# Patient Record
Sex: MALE | Race: ASIAN | URBAN - METROPOLITAN AREA
[De-identification: names, ages, dates, MRNs, and addresses within clinical notes are randomized per-mention and may not be internally consistent; named-entity substitution may affect disease eponyms.]

---

## 2022-01-27 ENCOUNTER — OFFICE VISIT (OUTPATIENT)
Dept: URBAN - METROPOLITAN AREA CLINIC 33 | Facility: CLINIC | Age: 42
End: 2022-01-27
Payer: COMMERCIAL

## 2022-01-27 VITALS
HEIGHT: 69 IN | DIASTOLIC BLOOD PRESSURE: 78 MMHG | WEIGHT: 230 LBS | HEART RATE: 78 BPM | BODY MASS INDEX: 34.07 KG/M2 | OXYGEN SATURATION: 99 % | SYSTOLIC BLOOD PRESSURE: 122 MMHG

## 2022-01-27 DIAGNOSIS — R14.3 FLATULENCE: ICD-10-CM

## 2022-01-27 DIAGNOSIS — R19.4 CHANGE IN BOWEL HABIT: ICD-10-CM

## 2022-01-27 DIAGNOSIS — R93.5 ABNORMAL ABDOMINAL CT SCAN: ICD-10-CM

## 2022-01-27 DIAGNOSIS — R10.12 LEFT UPPER QUADRANT PAIN: ICD-10-CM

## 2022-01-27 PROBLEM — 249504006 FLATULENCE: Status: ACTIVE | Noted: 2022-01-27

## 2022-01-27 PROCEDURE — 99213 OFFICE O/P EST LOW 20 MIN: CPT | Performed by: INTERNAL MEDICINE

## 2022-01-27 RX ORDER — SODIUM, POTASSIUM,MAG SULFATES 17.5-3.13G
177ML SOLUTION, RECONSTITUTED, ORAL ORAL ONCE A DAY
Qty: 354 ML | Refills: 0 | OUTPATIENT
Start: 2022-01-27 | End: 2022-01-29

## 2022-01-27 RX ORDER — CHLORHEXIDINE GLUCONATE 4 %
AS DIRECTED LIQUID (ML) TOPICAL
Status: ACTIVE | COMMUNITY

## 2022-01-27 NOTE — HPI-ABDOMINAL PAIN
40 y/o male presents today for a consultation for abdominal pain with onset 1 year ago.  Described as an inttermittent pinching pain that occur without provocation and will dissipate on it's own.   Pain initially began in LUQ abdominal region, occasionally radiating around to left and right side of his back.  In Sept. 2021 he began to experience pain in RUQ and LUQ.    Pain worsen Sept. 29 2021 and presented to [facility] ER at the request of PCP top r/o kidney stone.  A CT Abdomen and Pelvis was perfomred with findings of:  Multiple fluid-filled loops of nondilated small bowel with subtle inflammatory type changes of the mesentery such as may be associated with enteritis in the appropriate clinical setting.  He has not taken medication for symptoms.

## 2022-01-27 NOTE — HPI-DIARRHEA
He reports change in bowel habits over the past month.   Described as more frequent bowel movements per day, 2-3 times a day with varying stool consistency.  Stools are pebble-like to lose pieces.  Denies melena, blood, mucus in stools, rectal pain/bleeding.  Admit occasional pruritus ani.   He completed stool studies with PCP (1/19/2022).   Previous bowel habits were 1 normal and formed evacuation per day without strain.   He was treated with an antibiotic in Sept. 2021 for COVID-19.  Denies drinking well or lake water or travel out of the country in the past 6 months.   He denies having a colonoscopy.  Denies a known family history of colon polyps or cancer.

## 2022-02-01 ENCOUNTER — TELEPHONE ENCOUNTER (OUTPATIENT)
Dept: URBAN - METROPOLITAN AREA CLINIC 36 | Facility: CLINIC | Age: 42
End: 2022-02-01

## 2022-02-01 RX ORDER — SODIUM, POTASSIUM,MAG SULFATES 17.5-3.13G
177ML SOLUTION, RECONSTITUTED, ORAL ORAL ONCE A DAY
Qty: 354 ML | Refills: 0
Start: 2022-01-27 | End: 2022-02-03

## 2022-02-02 ENCOUNTER — TELEPHONE ENCOUNTER (OUTPATIENT)
Dept: URBAN - METROPOLITAN AREA CLINIC 36 | Facility: CLINIC | Age: 42
End: 2022-02-02

## 2022-02-02 RX ORDER — SODIUM, POTASSIUM,MAG SULFATES 17.5-3.13G
177ML SOLUTION, RECONSTITUTED, ORAL ORAL ONCE A DAY
Qty: 354 ML | OUTPATIENT

## 2022-02-16 PROBLEM — 301715003 LEFT UPPER QUADRANT PAIN: Status: ACTIVE | Noted: 2022-01-27

## 2022-02-17 ENCOUNTER — TELEPHONE ENCOUNTER (OUTPATIENT)
Dept: URBAN - METROPOLITAN AREA CLINIC 35 | Facility: CLINIC | Age: 42
End: 2022-02-17

## 2022-02-18 ENCOUNTER — OFFICE VISIT (OUTPATIENT)
Dept: URBAN - METROPOLITAN AREA MEDICAL CENTER 10 | Facility: MEDICAL CENTER | Age: 42
End: 2022-02-18
Payer: COMMERCIAL

## 2022-02-18 DIAGNOSIS — K52.81 EOSINOPHILIC GASTROENTERITIS: ICD-10-CM

## 2022-02-18 DIAGNOSIS — K63.89 BACTERIAL OVERGROWTH SYNDROME: ICD-10-CM

## 2022-02-18 DIAGNOSIS — R19.4 ALTERATION IN BOWEL ELIMINATION: ICD-10-CM

## 2022-02-18 DIAGNOSIS — R93.3 ABN FINDINGS-GI TRACT: ICD-10-CM

## 2022-02-18 DIAGNOSIS — D12.4 ADENOMA OF DESCENDING COLON: ICD-10-CM

## 2022-02-18 DIAGNOSIS — K31.89 ACQUIRED DEFORMITY OF DUODENUM: ICD-10-CM

## 2022-02-18 DIAGNOSIS — K29.60 ADENOPAPILLOMATOSIS GASTRICA: ICD-10-CM

## 2022-02-18 DIAGNOSIS — D12.8 ADENOMATOUS POLYP OF RECTUM: ICD-10-CM

## 2022-02-18 DIAGNOSIS — D12.2 ADENOMA OF ASCENDING COLON: ICD-10-CM

## 2022-02-18 PROCEDURE — 45380 COLONOSCOPY AND BIOPSY: CPT | Performed by: INTERNAL MEDICINE

## 2022-02-18 PROCEDURE — 43239 EGD BIOPSY SINGLE/MULTIPLE: CPT | Performed by: INTERNAL MEDICINE

## 2022-02-18 PROCEDURE — 45385 COLONOSCOPY W/LESION REMOVAL: CPT | Performed by: INTERNAL MEDICINE

## 2022-03-07 ENCOUNTER — OFFICE VISIT (OUTPATIENT)
Dept: URBAN - METROPOLITAN AREA CLINIC 33 | Facility: CLINIC | Age: 42
End: 2022-03-07

## 2022-03-14 ENCOUNTER — OFFICE VISIT (OUTPATIENT)
Dept: URBAN - METROPOLITAN AREA CLINIC 33 | Facility: CLINIC | Age: 42
End: 2022-03-14
Payer: COMMERCIAL

## 2022-03-14 VITALS
HEART RATE: 67 BPM | SYSTOLIC BLOOD PRESSURE: 138 MMHG | OXYGEN SATURATION: 97 % | BODY MASS INDEX: 35.4 KG/M2 | HEIGHT: 69 IN | DIASTOLIC BLOOD PRESSURE: 80 MMHG | WEIGHT: 239 LBS

## 2022-03-14 DIAGNOSIS — D12.2 BENIGN NEOPLASM OF ASCENDING COLON: ICD-10-CM

## 2022-03-14 DIAGNOSIS — D12.4 BENIGN NEOPLASM OF DESCENDING COLON: ICD-10-CM

## 2022-03-14 DIAGNOSIS — K62.1 RECTAL POLYP: ICD-10-CM

## 2022-03-14 DIAGNOSIS — K29.80 DUODENITIS WITHOUT BLEEDING: ICD-10-CM

## 2022-03-14 DIAGNOSIS — R16.1 SPLENOMEGALY: ICD-10-CM

## 2022-03-14 DIAGNOSIS — K21.00 GASTRO-ESOPHAGEAL REFLUX DISEASE WITH ESOPHAGITIS, WITHOUT BLEEDING: ICD-10-CM

## 2022-03-14 DIAGNOSIS — R19.4 CHANGE IN BOWEL HABIT: ICD-10-CM

## 2022-03-14 PROCEDURE — 99214 OFFICE O/P EST MOD 30 MIN: CPT | Performed by: INTERNAL MEDICINE

## 2022-03-14 RX ORDER — CHLORHEXIDINE GLUCONATE 4 %
AS DIRECTED LIQUID (ML) TOPICAL
Status: ACTIVE | COMMUNITY

## 2022-03-14 RX ORDER — FAMOTIDINE 40 MG/1
1 TABLET AT BEDTIME TABLET, FILM COATED ORAL ONCE A DAY
Qty: 90 TABLET | Refills: 3 | OUTPATIENT
Start: 2022-03-14

## 2022-03-14 RX ORDER — SODIUM, POTASSIUM,MAG SULFATES 17.5-3.13G
177ML SOLUTION, RECONSTITUTED, ORAL ORAL ONCE A DAY
Qty: 354 ML | Status: DISCONTINUED | COMMUNITY

## 2022-03-14 NOTE — HPI-GAS
Continue to admit increase flatulence since last visit.  Last visit (1/27/2022) Admits increase in flatulence over the past month.  He has not taken medication for symptoms

## 2022-03-14 NOTE — HPI-ABDOMINAL PAIN
Patient presents today after his EGD, Colonoscopy, CT, Labs and follow up of abdominal pain. He denies any complications following procedures.    He admits continued episodes of LUQ and RUQ abdominal pain.  Described as an intermittent pinching pain that occur without provocation and will last for seconds to hours then dissipate.   Last visit (1/27/2022)   42 y/o male presents today for a consultation for abdominal pain with onset 1 year ago.  Described as an intermittent pinching pain that occur without provocation and will dissipate on its own.   Pain initially began in LUQ abdominal region, occasionally radiating around to left and right side of his back.  In Sept. 2021 he began to experience pain in RUQ and LUQ.    Pain worsen Sept. 29 2021 and presented to [facility] ER at the request of PCP top r/o kidney stone.  A CT Abdomen and Pelvis was performed with findings of:  Multiple fluid-filled loops of nondilated small bowel with subtle inflammatory type changes of the mesentery such as may be associated with enteritis in the appropriate clinical setting.  He has not taken medication for symptoms.

## 2022-03-14 NOTE — HPI-DIARRHEA
Currently, report 2 Bowel movements per day with varying stool consistency.  Stools are formed to pebble-like to lose pieces.  Denies melena, blood, mucus in stools, rectal pain/bleeding.   Admit occasional pruritus ani.    Last visit (1/27/2022) He reports change in bowel habits over the past month.   Described as more frequent bowel movements per day, 2-3 times a day with varying stool consistency.  Stools are pebble-like to lose pieces.  Denies melena, blood, mucus in stools, rectal pain/bleeding.  Admit occasional pruritus ani.   He completed stool studies with PCP (1/19/2022).   Previous bowel habits were 1 normal and formed evacuation per day without strain.   He was treated with an antibiotic in Sept. 2021 for COVID-19.  Denies drinking well or lake water or travel out of the country in the past 6 months.  He denies having a colonoscopy.  Denies a known family history of colon polyps or cancer.

## 2022-03-15 ENCOUNTER — TELEPHONE ENCOUNTER (OUTPATIENT)
Dept: URBAN - METROPOLITAN AREA CLINIC 36 | Facility: CLINIC | Age: 42
End: 2022-03-15

## 2022-03-16 ENCOUNTER — TELEPHONE ENCOUNTER (OUTPATIENT)
Dept: URBAN - METROPOLITAN AREA CLINIC 36 | Facility: CLINIC | Age: 42
End: 2022-03-16

## 2022-03-17 ENCOUNTER — TELEPHONE ENCOUNTER (OUTPATIENT)
Dept: URBAN - METROPOLITAN AREA CLINIC 35 | Facility: CLINIC | Age: 42
End: 2022-03-17

## 2022-03-31 ENCOUNTER — TELEPHONE ENCOUNTER (OUTPATIENT)
Dept: URBAN - METROPOLITAN AREA CLINIC 36 | Facility: CLINIC | Age: 42
End: 2022-03-31

## 2022-04-10 ENCOUNTER — TELEPHONE ENCOUNTER (OUTPATIENT)
Dept: URBAN - METROPOLITAN AREA CLINIC 35 | Facility: CLINIC | Age: 42
End: 2022-04-10

## 2022-04-14 ENCOUNTER — TELEPHONE ENCOUNTER (OUTPATIENT)
Dept: URBAN - METROPOLITAN AREA CLINIC 35 | Facility: CLINIC | Age: 42
End: 2022-04-14

## 2022-04-18 ENCOUNTER — OFFICE VISIT (OUTPATIENT)
Dept: URBAN - METROPOLITAN AREA CLINIC 33 | Facility: CLINIC | Age: 42
End: 2022-04-18
Payer: COMMERCIAL

## 2022-04-18 ENCOUNTER — DASHBOARD ENCOUNTERS (OUTPATIENT)
Age: 42
End: 2022-04-18

## 2022-04-18 VITALS
OXYGEN SATURATION: 97 % | BODY MASS INDEX: 33.77 KG/M2 | HEART RATE: 72 BPM | SYSTOLIC BLOOD PRESSURE: 138 MMHG | WEIGHT: 228 LBS | DIASTOLIC BLOOD PRESSURE: 82 MMHG | HEIGHT: 69 IN

## 2022-04-18 DIAGNOSIS — K29.80 DUODENITIS WITHOUT BLEEDING: ICD-10-CM

## 2022-04-18 DIAGNOSIS — R19.4 CHANGE IN BOWEL HABIT: ICD-10-CM

## 2022-04-18 DIAGNOSIS — D12.2 BENIGN NEOPLASM OF ASCENDING COLON: ICD-10-CM

## 2022-04-18 DIAGNOSIS — K21.00 GASTRO-ESOPHAGEAL REFLUX DISEASE WITH ESOPHAGITIS, WITHOUT BLEEDING: ICD-10-CM

## 2022-04-18 DIAGNOSIS — K62.1 RECTAL POLYP: ICD-10-CM

## 2022-04-18 DIAGNOSIS — D12.4 BENIGN NEOPLASM OF DESCENDING COLON: ICD-10-CM

## 2022-04-18 DIAGNOSIS — I77.4 MEDIAN ARCUATE LIGAMENT SYNDROME: ICD-10-CM

## 2022-04-18 DIAGNOSIS — R16.1 SPLENOMEGALY: ICD-10-CM

## 2022-04-18 PROBLEM — 39772007 RECTAL POLYP: Status: ACTIVE | Noted: 2022-04-18

## 2022-04-18 PROBLEM — 92076000 BENIGN NEOPLASM OF DESCENDING COLON: Status: ACTIVE | Noted: 2022-04-18

## 2022-04-18 PROBLEM — 16294009 SPLENOMEGALY: Status: ACTIVE | Noted: 2022-04-18

## 2022-04-18 PROBLEM — 91985000 BENIGN NEOPLASM OF ASCENDING COLON: Status: ACTIVE | Noted: 2022-04-18

## 2022-04-18 PROBLEM — 88111009 CHANGE IN BOWEL HABIT: Status: ACTIVE | Noted: 2022-01-27

## 2022-04-18 PROCEDURE — 99214 OFFICE O/P EST MOD 30 MIN: CPT | Performed by: INTERNAL MEDICINE

## 2022-04-18 RX ORDER — FAMOTIDINE 40 MG/1
1 TABLET AT BEDTIME TABLET, FILM COATED ORAL ONCE A DAY
Qty: 90 TABLET | Refills: 3 | OUTPATIENT

## 2022-04-18 RX ORDER — FAMOTIDINE 40 MG/1
1 TABLET AT BEDTIME TABLET, FILM COATED ORAL ONCE A DAY
Qty: 90 TABLET | Refills: 3 | Status: ACTIVE | COMMUNITY
Start: 2022-03-14

## 2022-04-18 RX ORDER — CHLORHEXIDINE GLUCONATE 4 %
AS DIRECTED LIQUID (ML) TOPICAL
Status: ACTIVE | COMMUNITY

## 2022-04-18 RX ORDER — OMEPRAZOLE 20 MG/1
1 CAPSULE 30 MINUTES BEFORE MORNING MEAL CAPSULE, DELAYED RELEASE ORAL TWICE A DAY
Qty: 180 | Refills: 1 | OUTPATIENT
Start: 2022-04-18

## 2022-04-18 NOTE — HPI-PERSONAL HISTORY OF COLON POLYPS
Patient found to have colon polyps via Colonoscopy performed on (2/18/2022) findings consist of Ascending polyp-Sessile Serrated Adenoma, Rectal polyp-Tubular Adenoma, Descending polyp-Tubular Adenoma.    Plan to repeat Colonoscopy in 3 years.

## 2022-04-18 NOTE — HPI-GERD
He has bee taking Famotidine 40 MG, 1 QD for Gastro-esophageal reflux disease with esophagitis with improvement of symptoms.

## 2022-04-18 NOTE — HPI-DIARRHEA
Currently, admit 1 formed bowel movement per day.  Denies melena, blood, mucus in stools, rectal pain/bleeding.  Admit occasional pruritus ani.  Of, note patient has been fasting from 6am -8 pm for Ramadan over the past 17 days.  Last visit (3/14/2022) Currently, report 2 Bowel movements per day with varying stool consistency.  Stools are formed to pebble-like to lose pieces.  Denies melena, blood, mucus in stools, rectal pain/bleeding.   Admit occasional pruritus ani.  Last visit (1/27/2022) He reports change in bowel habits over the past month.   Described as more frequent bowel movements per day, 2-3 times a day with varying stool consistency.  Stools are pebble-like to lose pieces.  Denies melena, blood, mucus in stools, rectal pain/bleeding.  Admit occasional pruritus ani.   He completed stool studies with PCP (1/19/2022).   Previous bowel habits were 1 normal and formed evacuation per day without strain.   He was treated with an antibiotic in Sept. 2021 for COVID-19.  Denies drinking well or lake water or travel out of the country in the past 6 months.  He denies having a colonoscopy.  Denies a known family history of colon polyps or cancer.

## 2022-04-18 NOTE — HPI-ABDOMINAL PAIN
Patient presents today to review MR Elastography, follow up of LUQ and RUQ abdominal pain.    He reports a constant extreme sharp pain x 4 days prior to MRI. SInce then the pain is still sharp in nature, but intermittently throughout the week.  Denies aggravating or alleviating factors.  Of, note patient has been fasting from 6am -8 pm for Ramadan over the past 17 days.  Last visit (3/14/2022) Patient presents today after his EGD, Colonoscopy, CT, Labs and follow up of abdominal pain. He denies any complications following procedures.    He admits continued episodes of LUQ and RUQ abdominal pain.  Described as an intermittent pinching pain that occur without provocation and will last for seconds to hours then dissipate.   Last visit (1/27/2022)   40 y/o male presents today for a consultation for abdominal pain with onset 1 year ago.  Described as an intermittent pinching pain that occur without provocation and will dissipate on its own.   Pain initially began in LUQ abdominal region, occasionally radiating around to left and right side of his back.  In Sept. 2021 he began to experience pain in RUQ and LUQ.    Pain worsen Sept. 29 2021 and presented to [facility] ER at the request of PCP top r/o kidney stone.  A CT Abdomen and Pelvis was performed with findings of:  Multiple fluid-filled loops of nondilated small bowel with subtle inflammatory type changes of the mesentery such as may be associated with enteritis in the appropriate clinical setting.  He has not taken medication for symptoms.

## 2022-04-18 NOTE — HPI-GAS
He continue to admit flatulence since last visit.  Last visit (3/14/2022)  Continue to admit increase flatulence since last visit.  Last visit (1/27/2022) Admits increase in flatulence over the past month.  He has not taken medication for symptoms

## 2022-04-25 ENCOUNTER — OFFICE VISIT (OUTPATIENT)
Dept: URBAN - METROPOLITAN AREA CLINIC 33 | Facility: CLINIC | Age: 42
End: 2022-04-25

## 2022-04-25 RX ORDER — OMEPRAZOLE 20 MG/1
1 CAPSULE 30 MINUTES BEFORE MORNING MEAL CAPSULE, DELAYED RELEASE ORAL TWICE A DAY
Qty: 180 | Refills: 1 | Status: ACTIVE | COMMUNITY
Start: 2022-04-18

## 2022-04-25 RX ORDER — CHLORHEXIDINE GLUCONATE 4 %
AS DIRECTED LIQUID (ML) TOPICAL
Status: ACTIVE | COMMUNITY

## 2022-07-18 ENCOUNTER — TELEPHONE ENCOUNTER (OUTPATIENT)
Dept: URBAN - METROPOLITAN AREA CLINIC 33 | Facility: CLINIC | Age: 42
End: 2022-07-18

## 2022-07-18 ENCOUNTER — OFFICE VISIT (OUTPATIENT)
Dept: URBAN - METROPOLITAN AREA CLINIC 33 | Facility: CLINIC | Age: 42
End: 2022-07-18

## 2022-07-18 RX ORDER — OMEPRAZOLE 20 MG/1
1 CAPSULE 30 MINUTES BEFORE MORNING MEAL CAPSULE, DELAYED RELEASE ORAL TWICE A DAY
Qty: 180 | Refills: 1 | OUTPATIENT

## 2022-07-18 RX ORDER — CHLORHEXIDINE GLUCONATE 4 %
AS DIRECTED LIQUID (ML) TOPICAL
Status: ACTIVE | COMMUNITY

## 2022-07-18 RX ORDER — OMEPRAZOLE 20 MG/1
1 CAPSULE 30 MINUTES BEFORE MORNING MEAL CAPSULE, DELAYED RELEASE ORAL TWICE A DAY
Qty: 180 | Refills: 1 | Status: ACTIVE | COMMUNITY
Start: 2022-04-18

## 2022-07-18 NOTE — HPI-GERD
Patient admits/denies any break through episodes of acid reflux symptoms. He admits/denies the continued use of Omeprazole 20 mg 1 PO QD and states that it does/not control any symptoms.   Last visit (4/18/2022)  He has bee taking Famotidine 40 MG, 1 QD for Gastro-esophageal reflux disease with esophagitis with improvement of symptoms.

## 2022-07-18 NOTE — HPI-DIARRHEA
Patient admits/denies improvement in episodes of diarrhea since his last visit with the use of --- . Currently he reports -- bowel movements -- with/out strain. His stools are --- with/out blood, mucus, or melena. He admits/denies any episodes of rectal pain or pruritus  ani.    Last visit (4/18/2022)  Currently, admit 1 formed bowel movement per day.  Denies melena, blood, mucus in stools, rectal pain/bleeding.  Admit occasional pruritus ani.  Of, note patient has been fasting from 6am -8 pm for Ramadan over the past 17 days.

## 2022-07-18 NOTE — HPI-PERSONAL HISTORY OF COLON POLYPS
He has been placed on a 3 year colonoscopy surveillance - Due  2/18/2025.     Last visit (4/18/2022)  Patient found to have colon polyps via Colonoscopy performed on (2/18/2022) findings consist of Ascending polyp-Sessile Serrated Adenoma, Rectal polyp-Tubular Adenoma, Descending polyp-Tubular Adenoma.    Plan to repeat Colonoscopy in 3 years.

## 2022-07-18 NOTE — HPI-ABDOMINAL PAIN
Patient presents today to review MR Elastography, follow up of LUQ and RUQ abdominal pain.    He reports a constant extreme sharp pain x 4 days prior to MRI. SInce then the pain is still sharp in nature, but intermittently throughout the week.  Denies aggravating or alleviating factors.  Of, note patient has been fasting from 6am -8 pm for Ramadan over the past 17 days.  Last visit (3/14/2022) Patient presents today after his EGD, Colonoscopy, CT, Labs and follow up of abdominal pain. He denies any complications following procedures.    He admits continued episodes of LUQ and RUQ abdominal pain.  Described as an intermittent pinching pain that occur without provocation and will last for seconds to hours then dissipate.   Last visit (1/27/2022)   40 y/o male presents today for a consultation for abdominal pain with onset 1 year ago.  Described as an intermittent pinching pain that occur without provocation and will dissipate on its own.   Pain initially began in LUQ abdominal region, occasionally radiating around to left and right side of his back.  In Sept. 2021 he began to experience pain in RUQ and LUQ.    Pain worsen Sept. 29 2021 and presented to [facility] ER at the request of PCP top r/o kidney stone.  A CT Abdomen and Pelvis was performed with findings of:  Multiple fluid-filled loops of nondilated small bowel with subtle inflammatory type changes of the mesentery such as may be associated with enteritis in the appropriate clinical setting.  He has not taken medication for symptoms.

## 2022-07-18 NOTE — HPI-GAS
He admits/denies improvement in episodes of gas.    Last visit (4/18/2022)  He continue to admit flatulence since last visit.

## 2022-07-18 NOTE — HPI-ABDOMINAL PAIN
Patient presents today for a follow up of abdominal pain. He admits/denies continued abdominal pain since his last visit.   He admits/denies improvement in pain and reports ---.    Last visit (4/18/2022)  41 Year old male patient presents today for abdominal pain that is located --. He describes pain as a ---. Pain onset --, with episodes occurring every -- and lasting --. Denies/admits any aggravating factors. Denies/admits any alleviating factors.   He has tried -- with -- relief of symptoms. Admits/Denies the use of antibiotics within the last (3-6) months. Admits/Denies any previous imaging, labs or procedures for symptoms.

## 2022-08-04 ENCOUNTER — TELEPHONE ENCOUNTER (OUTPATIENT)
Dept: URBAN - METROPOLITAN AREA CLINIC 35 | Facility: CLINIC | Age: 42
End: 2022-08-04

## 2022-08-04 ENCOUNTER — OFFICE VISIT (OUTPATIENT)
Dept: URBAN - METROPOLITAN AREA CLINIC 33 | Facility: CLINIC | Age: 42
End: 2022-08-04

## 2022-08-04 PROBLEM — 72007001 DUODENITIS: Status: ACTIVE | Noted: 2022-03-14

## 2022-08-04 PROBLEM — 9250002: Status: ACTIVE | Noted: 2022-04-18

## 2022-08-04 RX ORDER — OMEPRAZOLE 20 MG/1
1 CAPSULE 30 MINUTES BEFORE MORNING MEAL CAPSULE, DELAYED RELEASE ORAL TWICE A DAY
Qty: 180 | Refills: 1 | Status: ACTIVE | COMMUNITY

## 2022-08-04 RX ORDER — CHLORHEXIDINE GLUCONATE 4 %
AS DIRECTED LIQUID (ML) TOPICAL
Status: ACTIVE | COMMUNITY

## 2022-08-04 RX ORDER — OMEPRAZOLE 20 MG/1
1 CAPSULE 30 MINUTES BEFORE MORNING MEAL CAPSULE, DELAYED RELEASE ORAL TWICE A DAY
Qty: 180 | Refills: 1 | OUTPATIENT

## 2022-08-04 NOTE — HPI-ABDOMINAL PAIN
Patient presents today to review MR Elastography, follow up of LUQ and RUQ abdominal pain.    He reports a constant extreme sharp pain x 4 days prior to MRI. SInce then the pain is still sharp in nature, but intermittently throughout the week.  Denies aggravating or alleviating factors.  Of, note patient has been fasting from 6am -8 pm for Ramadan over the past 17 days.  Last visit (3/14/2022) Patient presents today after his EGD, Colonoscopy, CT, Labs and follow up of abdominal pain. He denies any complications following procedures.    He admits continued episodes of LUQ and RUQ abdominal pain.  Described as an intermittent pinching pain that occur without provocation and will last for seconds to hours then dissipate.   Last visit (1/27/2022)   40 y/o male presents today for a consultation for abdominal pain with onset 1 year ago.  Described as an intermittent pinching pain that occur without provocation and will dissipate on its own.   Pain initially began in LUQ abdominal region, occasionally radiating around to left and right side of his back.  In Sept. 2021 he began to experience pain in RUQ and LUQ.    Pain worsen Sept. 29 2021 and presented to [facility] ER at the request of PCP top r/o kidney stone.  A CT Abdomen and Pelvis was performed with findings of:  Multiple fluid-filled loops of nondilated small bowel with subtle inflammatory type changes of the mesentery such as may be associated with enteritis in the appropriate clinical setting.  He has not taken medication for symptoms.

## 2025-08-19 ENCOUNTER — LAB OUTSIDE AN ENCOUNTER (OUTPATIENT)
Dept: URBAN - METROPOLITAN AREA CLINIC 23 | Facility: CLINIC | Age: 45
End: 2025-08-19

## 2025-08-19 ENCOUNTER — OFFICE VISIT (OUTPATIENT)
Dept: URBAN - METROPOLITAN AREA CLINIC 23 | Facility: CLINIC | Age: 45
End: 2025-08-19
Payer: COMMERCIAL

## 2025-08-19 DIAGNOSIS — Z86.0101 PERSONAL HISTORY OF ADENOMATOUS AND SERRATED COLON POLYPS: ICD-10-CM

## 2025-08-19 DIAGNOSIS — R10.12 LUQ PAIN: ICD-10-CM

## 2025-08-19 DIAGNOSIS — R10.13 DYSPEPSIA: ICD-10-CM

## 2025-08-19 PROBLEM — 428283002: Status: ACTIVE | Noted: 2025-08-19

## 2025-08-19 PROBLEM — 116289008: Status: ACTIVE | Noted: 2025-08-19

## 2025-08-19 PROBLEM — 301715003: Status: ACTIVE | Noted: 2025-08-19

## 2025-08-19 PROBLEM — 162031009: Status: ACTIVE | Noted: 2025-08-19

## 2025-08-19 PROCEDURE — 99204 OFFICE O/P NEW MOD 45 MIN: CPT | Performed by: STUDENT IN AN ORGANIZED HEALTH CARE EDUCATION/TRAINING PROGRAM

## 2025-08-19 RX ORDER — OMEPRAZOLE 40 MG/1
1 CAPSULE 1/2 TO 1 HOUR BEFORE MORNING MEAL CAPSULE, DELAYED RELEASE ORAL ONCE A DAY
Qty: 30 | OUTPATIENT
Start: 2025-08-19